# Patient Record
Sex: MALE | Race: WHITE | NOT HISPANIC OR LATINO | Employment: OTHER | URBAN - METROPOLITAN AREA
[De-identification: names, ages, dates, MRNs, and addresses within clinical notes are randomized per-mention and may not be internally consistent; named-entity substitution may affect disease eponyms.]

---

## 2018-06-25 ENCOUNTER — OFFICE VISIT (OUTPATIENT)
Dept: GASTROENTEROLOGY | Facility: CLINIC | Age: 68
End: 2018-06-25
Payer: COMMERCIAL

## 2018-06-25 VITALS
HEART RATE: 68 BPM | BODY MASS INDEX: 31.27 KG/M2 | DIASTOLIC BLOOD PRESSURE: 88 MMHG | WEIGHT: 199.2 LBS | TEMPERATURE: 98.5 F | SYSTOLIC BLOOD PRESSURE: 112 MMHG | HEIGHT: 67 IN

## 2018-06-25 DIAGNOSIS — R19.5 OCCULT BLOOD IN STOOLS: Primary | ICD-10-CM

## 2018-06-25 PROCEDURE — 99244 OFF/OP CNSLTJ NEW/EST MOD 40: CPT | Performed by: INTERNAL MEDICINE

## 2018-06-25 RX ORDER — HYDROCODONE BITARTRATE AND ACETAMINOPHEN 5; 325 MG/1; MG/1
TABLET ORAL
Refills: 0 | COMMUNITY
Start: 2018-05-26

## 2018-06-25 RX ORDER — FUROSEMIDE 20 MG/1
20 TABLET ORAL
COMMUNITY
Start: 2018-06-14

## 2018-06-25 RX ORDER — ATORVASTATIN CALCIUM 10 MG/1
10 TABLET, FILM COATED ORAL
COMMUNITY
Start: 2018-06-14

## 2018-06-25 RX ORDER — LEVOTHYROXINE SODIUM 0.05 MG/1
50 TABLET ORAL
COMMUNITY
Start: 2018-06-14

## 2018-06-25 RX ORDER — METOPROLOL SUCCINATE 100 MG/1
100 TABLET, EXTENDED RELEASE ORAL
COMMUNITY
Start: 2018-06-14

## 2018-06-25 RX ORDER — CILOSTAZOL 50 MG/1
50 TABLET ORAL
COMMUNITY
Start: 2018-06-14

## 2018-06-25 RX ORDER — OMEPRAZOLE 40 MG/1
CAPSULE, DELAYED RELEASE ORAL
Refills: 3 | COMMUNITY
Start: 2018-06-19

## 2018-06-25 RX ORDER — ACETAMINOPHEN 500 MG
500 TABLET ORAL
COMMUNITY

## 2018-06-25 RX ORDER — ALPRAZOLAM 0.25 MG/1
0.25 TABLET ORAL
COMMUNITY

## 2018-06-25 NOTE — ASSESSMENT & PLAN NOTE
Occult blood in the stool  Possible from colorectal lesions including polyps or malignancy  Should also rule out peptic ulcer disease or gastric erosions  Rule out AV malformations  Patient has history of what appeared to be a Billroth II for bleeding peptic ulcer disease    -Schedule for both EGD and colonoscopy  -High-fiber diet     -Patient was given instructions about the colonoscopy prep     -Patient was explained about  the risks and benefits of the procedure  Risks including but not limited to bleeding, infection, perforation were explained in detail  Also explained about less than 100% sensitivity with the exam and other alternatives

## 2018-06-25 NOTE — PROGRESS NOTES
Consultation - [facility]  Marsha Key 1950 male         Chief Complaint:  Heme-positive stool    HPI:  14-year-old male with history of heart disease was referred for colonoscopy  He was tested positive to for occult blood in the stool  He reports straining hard that day but the stool was normal brown colored  Patient has regular bowel movements and denies any blood or mucus in the stool  Appetite is good and denies any recent weight loss  Denies any abdominal pain, nausea, or vomiting  Has no heartburn or acid reflux  Denies any difficulty swallowing  Patient gives history of stomach surgery for bleeding peptic ulcer disease about 15 years ago       REVIEW OF SYSTEMS: Review of Systems   Constitutional: Negative for activity change, appetite change, chills, diaphoresis, fatigue, fever and unexpected weight change  HENT: Negative for ear discharge, ear pain, facial swelling, hearing loss, nosebleeds, sore throat, tinnitus and voice change  Eyes: Negative for pain, discharge, redness, itching and visual disturbance  Respiratory: Negative for apnea, cough, chest tightness, shortness of breath and wheezing  Cardiovascular: Negative for chest pain and palpitations  Gastrointestinal:        As noted in HPI   Endocrine: Negative for cold intolerance, heat intolerance and polyuria  Genitourinary: Negative for difficulty urinating, dysuria, flank pain, hematuria and urgency  Musculoskeletal: Negative for arthralgias, back pain, gait problem, joint swelling and myalgias  Skin: Negative for rash and wound  Neurological: Negative for dizziness, tremors, seizures, speech difficulty, light-headedness, numbness and headaches  Hematological: Negative for adenopathy  Does not bruise/bleed easily  Psychiatric/Behavioral: Negative for agitation, behavioral problems and confusion  The patient is not nervous/anxious           Past Medical History:   Diagnosis Date    Arthritis     COPD (chronic obstructive pulmonary disease) (HCC)     Hepatitis C       Past Surgical History:   Procedure Laterality Date    ABDOMINAL SURGERY      HOLE IN STOMACH    CARDIAC SURGERY  2016     Social History     Social History    Marital status: /Civil Union     Spouse name: N/A    Number of children: N/A    Years of education: N/A     Occupational History    Not on file  Social History Main Topics    Smoking status: Former Smoker    Smokeless tobacco: Never Used    Alcohol use No    Drug use: No    Sexual activity: Not on file     Other Topics Concern    Not on file     Social History Narrative    No narrative on file     Family History   Problem Relation Age of Onset    Emphysema Mother     Heart disease Father      Patient has no known allergies  Current Outpatient Prescriptions   Medication Sig Dispense Refill    acetaminophen (TYLENOL) 500 mg tablet Take 500 mg by mouth      ALPRAZolam (XANAX) 0 25 mg tablet Take 0 25 mg by mouth      atorvastatin (LIPITOR) 10 mg tablet Take 10 mg by mouth      Cholecalciferol 2000 units CAPS Take by mouth      cilostazol (PLETAL) 50 mg tablet Take 50 mg by mouth      furosemide (LASIX) 20 mg tablet Take 20 mg by mouth      HYDROcodone-acetaminophen (NORCO) 5-325 mg per tablet   0    levothyroxine 50 mcg tablet Take 50 mcg by mouth      metoprolol succinate (TOPROL-XL) 100 mg 24 hr tablet Take 100 mg by mouth      omeprazole (PriLOSEC) 40 MG capsule   3    sacubitril-valsartan (ENTRESTO) 24-26 MG TABS TAKE 1 TABLET TWICE A DAY      VENTOLIN  (90 Base) MCG/ACT inhaler   6     No current facility-administered medications for this visit  Blood pressure 112/88, pulse 68, temperature 98 5 °F (36 9 °C), temperature source Tympanic, height 5' 7" (1 702 m), weight 90 4 kg (199 lb 3 2 oz)  PHYSICAL EXAM: Physical Exam   Constitutional: He is oriented to person, place, and time  He appears well-developed     HENT: Head: Normocephalic and atraumatic  Mouth/Throat: Oropharynx is clear and moist    Eyes: Conjunctivae are normal  Pupils are equal, round, and reactive to light  Right eye exhibits no discharge  Left eye exhibits no discharge  No scleral icterus  Neck: Neck supple  No JVD present  No tracheal deviation present  No thyromegaly present  Cardiovascular: Normal rate, regular rhythm, normal heart sounds and intact distal pulses  Exam reveals no gallop and no friction rub  No murmur heard  Pulmonary/Chest: Effort normal and breath sounds normal  No respiratory distress  He has no wheezes  He has no rales  He exhibits no tenderness  Abdominal: Soft  Bowel sounds are normal  He exhibits no distension and no mass  There is no tenderness  There is no rebound and no guarding  No hernia  Musculoskeletal: He exhibits no edema  Lymphadenopathy:     He has no cervical adenopathy  Neurological: He is alert and oriented to person, place, and time  Skin: Skin is warm and dry  No rash noted  No erythema  Psychiatric: He has a normal mood and affect  His behavior is normal  Thought content normal         No results found for: WBC, HGB, HCT, MCV, PLT  No results found for: GLUCOSE, CALCIUM, NA, K, CO2, CL, BUN, CREATININE  No results found for: ALT, AST, GGT, ALKPHOS, BILITOT  No results found for: INR, PROTIME    Patient was never admitted  ASSESSMENT & PLAN:    Occult blood in stools  Occult blood in the stool  Possible from colorectal lesions including polyps or malignancy  Should also rule out peptic ulcer disease or gastric erosions  Rule out AV malformations  Patient has history of what appeared to be a Billroth II for bleeding peptic ulcer disease    -Schedule for both EGD and colonoscopy  -High-fiber diet     -Patient was given instructions about the colonoscopy prep     -Patient was explained about  the risks and benefits of the procedure   Risks including but not limited to bleeding, infection, perforation were explained in detail  Also explained about less than 100% sensitivity with the exam and other alternatives

## 2018-06-25 NOTE — LETTER
June 25, 2018     [address]    Patient: Linnette Hughes   YOB: 1950   Date of Visit: 6/25/2018       Dear Dr Karon Ritter: Thank you for referring Linnette Hughes to me for evaluation  Below are my notes for this consultation  If you have questions, please do not hesitate to call me  I look forward to following your patient along with you           Sincerely,        Manuel Cheng MD        CC: Mery Perez

## 2018-07-23 ENCOUNTER — ANESTHESIA EVENT (OUTPATIENT)
Dept: GASTROENTEROLOGY | Facility: AMBULARY SURGERY CENTER | Age: 68
End: 2018-07-23
Payer: COMMERCIAL

## 2018-07-23 NOTE — ANESTHESIA PREPROCEDURE EVALUATION
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  Now in bigeminy  Might need ablation if it persists  Patient ET is limited due to SOB  Can walk barely a block  Is a chronic smoker,  Pulmonary  Smoker ex-smoker  , COPD ,        GI/Hepatic    Liver disease , Hepatitis C,             Endo/Other  History of thyroid disease , hypothyroidism,      GYN       Hematology   Musculoskeletal    Arthritis     Neurology   Psychology           Physical Exam    Airway    Mallampati score: II  TM Distance: >3 FB  Neck ROM: full     Dental   upper dentures and lower dentures,     Cardiovascular  Cardiovascular exam normal    Pulmonary  Pulmonary exam normal     Other Findings        Anesthesia Plan  ASA Score- 4     Anesthesia Type- IV sedation with anesthesia with ASA Monitors  Additional Monitors:   Airway Plan:         Plan Factors-    Induction-     Postoperative Plan-     Informed Consent- Anesthetic plan and risks discussed with patient  I personally reviewed this patient with the CRNA  Discussed and agreed on the Anesthesia Plan with the CRNA  Richi Zepeda

## 2018-07-24 ENCOUNTER — ANESTHESIA (OUTPATIENT)
Dept: GASTROENTEROLOGY | Facility: AMBULARY SURGERY CENTER | Age: 68
End: 2018-07-24
Payer: COMMERCIAL

## 2018-07-24 ENCOUNTER — HOSPITAL ENCOUNTER (OUTPATIENT)
Facility: AMBULARY SURGERY CENTER | Age: 68
Setting detail: OUTPATIENT SURGERY
Discharge: HOME | End: 2018-07-24
Attending: INTERNAL MEDICINE | Admitting: INTERNAL MEDICINE
Payer: COMMERCIAL

## 2018-07-24 VITALS
SYSTOLIC BLOOD PRESSURE: 140 MMHG | WEIGHT: 199 LBS | OXYGEN SATURATION: 98 % | HEIGHT: 67 IN | DIASTOLIC BLOOD PRESSURE: 73 MMHG | BODY MASS INDEX: 31.23 KG/M2 | HEART RATE: 68 BPM | RESPIRATION RATE: 18 BRPM | TEMPERATURE: 97.4 F

## 2018-07-24 DIAGNOSIS — R19.5 OCCULT BLOOD IN STOOLS: Primary | ICD-10-CM

## 2018-07-24 PROCEDURE — 43235 EGD DIAGNOSTIC BRUSH WASH: CPT | Performed by: INTERNAL MEDICINE

## 2018-07-24 PROCEDURE — G0105 COLORECTAL SCRN; HI RISK IND: HCPCS | Performed by: INTERNAL MEDICINE

## 2018-07-24 RX ORDER — SODIUM CHLORIDE 9 MG/ML
125 INJECTION, SOLUTION INTRAVENOUS CONTINUOUS
Status: DISCONTINUED | OUTPATIENT
Start: 2018-07-24 | End: 2018-07-24 | Stop reason: HOSPADM

## 2018-07-24 RX ORDER — LIDOCAINE HYDROCHLORIDE 10 MG/ML
INJECTION, SOLUTION INFILTRATION; PERINEURAL AS NEEDED
Status: DISCONTINUED | OUTPATIENT
Start: 2018-07-24 | End: 2018-07-24 | Stop reason: SURG

## 2018-07-24 RX ORDER — PROPOFOL 10 MG/ML
INJECTION, EMULSION INTRAVENOUS CONTINUOUS PRN
Status: DISCONTINUED | OUTPATIENT
Start: 2018-07-24 | End: 2018-07-24 | Stop reason: SURG

## 2018-07-24 RX ORDER — PROPOFOL 10 MG/ML
INJECTION, EMULSION INTRAVENOUS AS NEEDED
Status: DISCONTINUED | OUTPATIENT
Start: 2018-07-24 | End: 2018-07-24 | Stop reason: SURG

## 2018-07-24 RX ORDER — FENTANYL CITRATE 50 UG/ML
INJECTION, SOLUTION INTRAMUSCULAR; INTRAVENOUS AS NEEDED
Status: DISCONTINUED | OUTPATIENT
Start: 2018-07-24 | End: 2018-07-24 | Stop reason: SURG

## 2018-07-24 RX ADMIN — SODIUM CHLORIDE 125 ML/HR: 0.9 INJECTION, SOLUTION INTRAVENOUS at 09:00

## 2018-07-24 RX ADMIN — PROPOFOL 100 MG: 10 INJECTION, EMULSION INTRAVENOUS at 09:49

## 2018-07-24 RX ADMIN — LIDOCAINE HYDROCHLORIDE 30 MG: 10 INJECTION, SOLUTION INFILTRATION; PERINEURAL at 09:49

## 2018-07-24 RX ADMIN — PROPOFOL 60 MCG/KG/MIN: 10 INJECTION, EMULSION INTRAVENOUS at 09:55

## 2018-07-24 RX ADMIN — FENTANYL CITRATE 50 MCG: 50 INJECTION, SOLUTION INTRAMUSCULAR; INTRAVENOUS at 09:49

## 2018-07-24 NOTE — OP NOTE
ESOPHAGOGASTRODUODENOSCOPY    PROCEDURE: EGD    INDICATIONS: Occult GI Bleeding    POST-OP DIAGNOSIS: See the impression below    SEDATION: Monitored anesthesia care, check anesthesia records    PHYSICAL EXAM:    Vitals:    07/24/18 1028   BP: 140/73   Pulse: 68   Resp: 18   Temp:    SpO2: 98%    Body mass index is 31 17 kg/m²  General: NAD  Heart: S1 & S2 normal, RRR  Lungs: CTA, No rales or rhonchi  Abdomen: Soft, nontender, nondistended, good bowel sounds    CONSENT:  Informed consent was obtained for the procedure, including sedation after explaining the risks and benefits of the procedure  Risks including but not limited to bleeding, perforation, infection, aspiration were discussed in detail  Also explained about less than 100% sensitivity with the exam and other alternatives  PREPARATION:   EKG tracing, pulse oximetry, blood pressure were monitored throughout the procedure  Patient was identified by myself both verbally and by visual inspection of ID band  DESCRIPTION:   Patient was placed in the left lateral decubitus position and was sedated with the above medication  The gastroscope was introduced in to the oropharynx and the esophagus was intubated under direct visualization  Scope was passed down the esophagus up to 2nd part of the duodenum  A careful inspection was made as the gastroscope was withdrawn, including a retroflexed view of the stomach; findings and interventions are described below  FINDINGS:    #1  Esophagus and GEJ- Normal    #2  Stomach- normal mucosa  There appeared to be a small scar in the pre-pyloric area    #3  Duodenum- normal         IMPRESSIONS:      As above    RECOMMENDATIONS:     Proceed with colonoscopy    COMPLICATIONS:  None; patient tolerated the procedure well            DISPOSITION: PACU           CONDITION: Stable

## 2018-07-24 NOTE — H&P (VIEW-ONLY)
Consultation - [facility]  Partha Pandey 1950 male         Chief Complaint:  Heme-positive stool    HPI:  25-year-old male with history of heart disease was referred for colonoscopy  He was tested positive to for occult blood in the stool  He reports straining hard that day but the stool was normal brown colored  Patient has regular bowel movements and denies any blood or mucus in the stool  Appetite is good and denies any recent weight loss  Denies any abdominal pain, nausea, or vomiting  Has no heartburn or acid reflux  Denies any difficulty swallowing  Patient gives history of stomach surgery for bleeding peptic ulcer disease about 15 years ago       REVIEW OF SYSTEMS: Review of Systems   Constitutional: Negative for activity change, appetite change, chills, diaphoresis, fatigue, fever and unexpected weight change  HENT: Negative for ear discharge, ear pain, facial swelling, hearing loss, nosebleeds, sore throat, tinnitus and voice change  Eyes: Negative for pain, discharge, redness, itching and visual disturbance  Respiratory: Negative for apnea, cough, chest tightness, shortness of breath and wheezing  Cardiovascular: Negative for chest pain and palpitations  Gastrointestinal:        As noted in HPI   Endocrine: Negative for cold intolerance, heat intolerance and polyuria  Genitourinary: Negative for difficulty urinating, dysuria, flank pain, hematuria and urgency  Musculoskeletal: Negative for arthralgias, back pain, gait problem, joint swelling and myalgias  Skin: Negative for rash and wound  Neurological: Negative for dizziness, tremors, seizures, speech difficulty, light-headedness, numbness and headaches  Hematological: Negative for adenopathy  Does not bruise/bleed easily  Psychiatric/Behavioral: Negative for agitation, behavioral problems and confusion  The patient is not nervous/anxious           Past Medical History:   Diagnosis Date    Arthritis     COPD (chronic obstructive pulmonary disease) (HCC)     Hepatitis C       Past Surgical History:   Procedure Laterality Date    ABDOMINAL SURGERY      HOLE IN STOMACH    CARDIAC SURGERY  2016     Social History     Social History    Marital status: /Civil Union     Spouse name: N/A    Number of children: N/A    Years of education: N/A     Occupational History    Not on file  Social History Main Topics    Smoking status: Former Smoker    Smokeless tobacco: Never Used    Alcohol use No    Drug use: No    Sexual activity: Not on file     Other Topics Concern    Not on file     Social History Narrative    No narrative on file     Family History   Problem Relation Age of Onset    Emphysema Mother     Heart disease Father      Patient has no known allergies  Current Outpatient Prescriptions   Medication Sig Dispense Refill    acetaminophen (TYLENOL) 500 mg tablet Take 500 mg by mouth      ALPRAZolam (XANAX) 0 25 mg tablet Take 0 25 mg by mouth      atorvastatin (LIPITOR) 10 mg tablet Take 10 mg by mouth      Cholecalciferol 2000 units CAPS Take by mouth      cilostazol (PLETAL) 50 mg tablet Take 50 mg by mouth      furosemide (LASIX) 20 mg tablet Take 20 mg by mouth      HYDROcodone-acetaminophen (NORCO) 5-325 mg per tablet   0    levothyroxine 50 mcg tablet Take 50 mcg by mouth      metoprolol succinate (TOPROL-XL) 100 mg 24 hr tablet Take 100 mg by mouth      omeprazole (PriLOSEC) 40 MG capsule   3    sacubitril-valsartan (ENTRESTO) 24-26 MG TABS TAKE 1 TABLET TWICE A DAY      VENTOLIN  (90 Base) MCG/ACT inhaler   6     No current facility-administered medications for this visit  Blood pressure 112/88, pulse 68, temperature 98 5 °F (36 9 °C), temperature source Tympanic, height 5' 7" (1 702 m), weight 90 4 kg (199 lb 3 2 oz)  PHYSICAL EXAM: Physical Exam   Constitutional: He is oriented to person, place, and time  He appears well-developed     HENT: Head: Normocephalic and atraumatic  Mouth/Throat: Oropharynx is clear and moist    Eyes: Conjunctivae are normal  Pupils are equal, round, and reactive to light  Right eye exhibits no discharge  Left eye exhibits no discharge  No scleral icterus  Neck: Neck supple  No JVD present  No tracheal deviation present  No thyromegaly present  Cardiovascular: Normal rate, regular rhythm, normal heart sounds and intact distal pulses  Exam reveals no gallop and no friction rub  No murmur heard  Pulmonary/Chest: Effort normal and breath sounds normal  No respiratory distress  He has no wheezes  He has no rales  He exhibits no tenderness  Abdominal: Soft  Bowel sounds are normal  He exhibits no distension and no mass  There is no tenderness  There is no rebound and no guarding  No hernia  Musculoskeletal: He exhibits no edema  Lymphadenopathy:     He has no cervical adenopathy  Neurological: He is alert and oriented to person, place, and time  Skin: Skin is warm and dry  No rash noted  No erythema  Psychiatric: He has a normal mood and affect  His behavior is normal  Thought content normal         No results found for: WBC, HGB, HCT, MCV, PLT  No results found for: GLUCOSE, CALCIUM, NA, K, CO2, CL, BUN, CREATININE  No results found for: ALT, AST, GGT, ALKPHOS, BILITOT  No results found for: INR, PROTIME    Patient was never admitted  ASSESSMENT & PLAN:    Occult blood in stools  Occult blood in the stool  Possible from colorectal lesions including polyps or malignancy  Should also rule out peptic ulcer disease or gastric erosions  Rule out AV malformations  Patient has history of what appeared to be a Billroth II for bleeding peptic ulcer disease    -Schedule for both EGD and colonoscopy  -High-fiber diet     -Patient was given instructions about the colonoscopy prep     -Patient was explained about  the risks and benefits of the procedure   Risks including but not limited to bleeding, infection, perforation were explained in detail  Also explained about less than 100% sensitivity with the exam and other alternatives

## 2018-07-24 NOTE — OP NOTE
COLONOSCOPY    PROCEDURE: Colonoscopy    INDICATIONS:  Heme-positive stool    POST-OP DIAGNOSIS: See the impression below    SEDATION: Monitored anesthesia care, check anesthesia records    PHYSICAL EXAM:    /73 (BP Location: Left arm)   Pulse 68   Temp (!) 97 4 °F (36 3 °C) (Tympanic)   Resp 18   Ht 5' 7" (1 702 m)   Wt 90 3 kg (199 lb)   SpO2 98%   BMI 31 17 kg/m²   Body mass index is 31 17 kg/m²  General: NAD  Heart: S1 & S2 normal, RRR  Lungs: CTA, No rales or rhonchi  Abdomen: Soft, nontender, nondistended, good bowel sounds    CONSENT:  Informed consent was obtained for the procedure, including sedation after explaining the risks and benefits of the procedure  Risks including but not limited to bleeding, perforation, infection, aspiration were discussed in detail  Also explained about less than 100%$ sensitivity with the exam and other alternatives  PREPARATION:   EKG tracing, pulse oximetry, blood pressure were monitored throughout the procedure  Patient was identified by myself both verbally and by visual inspection of ID band  DESCRIPTION:   Patient was placed in the left lateral decubitus position and was sedated with the above medication  Digital rectal examination was performed  The colonoscope was introduced in to the anal canal and advanced up to cecum, which was identified by the appendiceal orifice and IC valve  A careful inspection was made as the colonoscope was withdrawn, including a retroflexed view of the rectum; findings and interventions are described below  Appropriate photodocumentation was obtained  The quality of the colonic preparation was adequate  FINDINGS:    1  Cecum and ileocecal valve-normal mucosa    2  Multiple diverticuli noted throughout the colon    3  Small internal hemorrhoids         IMPRESSIONS:      As above    RECOMMENDATIONS:    Check CBC and also order small bowel series to complete the workup    Heme-positive stool could be from hemorrhoids  COMPLICATIONS:  None; patient tolerated the procedure well      DISPOSITION: PACU           CONDITION: Stable

## 2024-10-25 ENCOUNTER — OFFICE VISIT (OUTPATIENT)
Dept: URGENT CARE | Facility: CLINIC | Age: 74
End: 2024-10-25
Payer: MEDICARE

## 2024-10-25 VITALS
WEIGHT: 162.2 LBS | HEIGHT: 67 IN | OXYGEN SATURATION: 96 % | HEART RATE: 75 BPM | BODY MASS INDEX: 25.46 KG/M2 | SYSTOLIC BLOOD PRESSURE: 150 MMHG | DIASTOLIC BLOOD PRESSURE: 80 MMHG | TEMPERATURE: 96.2 F | RESPIRATION RATE: 18 BRPM

## 2024-10-25 DIAGNOSIS — S51.811A FOREARM LACERATION, RIGHT, INITIAL ENCOUNTER: Primary | ICD-10-CM

## 2024-10-25 DIAGNOSIS — Z23 ENCOUNTER FOR IMMUNIZATION: ICD-10-CM

## 2024-10-25 DIAGNOSIS — S50.811A ABRASION OF RIGHT FOREARM, INITIAL ENCOUNTER: ICD-10-CM

## 2024-10-25 PROCEDURE — 90471 IMMUNIZATION ADMIN: CPT | Performed by: FAMILY MEDICINE

## 2024-10-25 PROCEDURE — 99213 OFFICE O/P EST LOW 20 MIN: CPT | Performed by: FAMILY MEDICINE

## 2024-10-25 PROCEDURE — 90715 TDAP VACCINE 7 YRS/> IM: CPT | Performed by: FAMILY MEDICINE

## 2024-10-25 PROCEDURE — 12032 INTMD RPR S/A/T/EXT 2.6-7.5: CPT | Performed by: FAMILY MEDICINE

## 2024-10-25 RX ORDER — BUDESONIDE AND FORMOTEROL FUMARATE DIHYDRATE 160; 4.5 UG/1; UG/1
2 AEROSOL RESPIRATORY (INHALATION) 2 TIMES DAILY
COMMUNITY

## 2024-10-25 RX ORDER — AMIODARONE HYDROCHLORIDE 200 MG/1
100 TABLET ORAL DAILY
COMMUNITY

## 2024-10-25 RX ORDER — IPRATROPIUM BROMIDE AND ALBUTEROL SULFATE .5; 3 MG/3ML; MG/3ML
3 SOLUTION RESPIRATORY (INHALATION) EVERY 6 HOURS PRN
COMMUNITY

## 2024-10-25 RX ORDER — SACUBITRIL AND VALSARTAN 49; 51 MG/1; MG/1
1 TABLET, FILM COATED ORAL 2 TIMES DAILY
COMMUNITY
Start: 2024-08-13

## 2024-10-25 RX ORDER — TIOTROPIUM BROMIDE 18 UG/1
18 CAPSULE ORAL; RESPIRATORY (INHALATION) DAILY
COMMUNITY

## 2024-10-25 RX ORDER — ASPIRIN 81 MG/1
81 TABLET ORAL DAILY
COMMUNITY

## 2024-10-25 RX ORDER — EMPAGLIFLOZIN 10 MG/1
10 TABLET, FILM COATED ORAL DAILY
COMMUNITY
Start: 2024-10-15

## 2024-10-25 NOTE — PROGRESS NOTES
"  [facility]        NAME: Petr Silva is a 73 y.o. male  : 1950    MRN: [id number]  DATE: 2024  TIME: 1:28 PM    Assessment and Plan   Forearm laceration, right, initial encounter [S51.811A]  1. Forearm laceration, right, initial encounter  Laceration repair      2. Abrasion of right forearm, initial encounter  Tdap Vaccine greater than or equal to 8yo      3. Encounter for immunization  Tdap Vaccine greater than or equal to 8yo        Dispensed medication: Bactrim DS x 3 days.    Universal Protocol:  Consent: The procedure was performed in an emergent situation. Verbal consent obtained.  Risks and benefits: risks, benefits and alternatives were discussed  Consent given by: patient and spouse  Time out: Immediately prior to procedure a \"time out\" was called to verify the correct patient, procedure, equipment, support staff and site/side marked as required.  Patient understanding: patient states understanding of the procedure being performed  Required items: required blood products, implants, devices, and special equipment available  Patient identity confirmed: verbally with patient  Laceration repair    Date/Time: 10/25/2024 3:30 PM    Performed by: Tommy Luna MD  Authorized by: Tommy Luna MD  Body area: upper extremity  Location details: right lower arm  Laceration length: 4 cm  Contamination: The wound is contaminated.  Foreign bodies: no foreign bodies  Tendon involvement: none  Nerve involvement: none  Anesthesia: local infiltration    Anesthesia:  Local Anesthetic: lidocaine 2% without epinephrine  Anesthetic total: 4 mL    Sedation:  Patient sedated: no        Procedure Details:  Preparation: Patient was prepped and draped in the usual sterile fashion.  Irrigation solution: saline (With Betadine)  Irrigation method: syringe  Amount of cleaning: standard  Debridement: minimal  Degree of undermining: none  Skin closure: 3-0 nylon  Number of sutures: " 5  Technique: simple  Approximation: close  Approximation difficulty: simple  Dressing: Xeroform gauze over the laceration and adjacent abrasions/skin tears.  Covered by nonadherent dressing and Coban.  Patient tolerance: patient tolerated the procedure well with no immediate complications  Comments: Tdap given.  3 days of Bactrim for wound prophylaxis.  Will return early next week for wound reassessment.  Sutures likely to be removed in around 2 weeks.  Wound care instructions given.        Patient Instructions     Follow up with PCP in 3-5 days.  Proceed to  ER if symptoms worsen.    If tests have been performed at [facility], our office will contact you with results if changes need to be made to the care plan discussed with you at the visit.  You can review your full results on [facility].    Chief Complaint     Chief Complaint   Patient presents with    Laceration     Laceration R forearm today - hit ground after falling off tractor (3 feet high). Not sure what cut him. States last Td <5 years ago         History of Present Illness       73-year-old right-hand-dominant male presents today due to a laceration sustained today on his right forearm after falling 3 feet from the seat of his tractor.  Fell onto a piece of old fence.  Last Tdap was    Laceration       Review of Systems   Review of Systems   Constitutional:  Negative for chills and fever.   Respiratory:  Negative for cough and shortness of breath.    Cardiovascular:  Negative for chest pain.   Gastrointestinal:  Negative for abdominal pain and nausea.   Musculoskeletal:  Positive for back pain.   Skin:  Positive for color change and wound.   Neurological:  Negative for dizziness and headaches.     Current Medications       Current Outpatient Medications:     acetaminophen (TYLENOL) 500 mg tablet, Take 500 mg by mouth, Disp: , Rfl:     ALPRAZolam (XANAX) 0.25 mg tablet, Take 0.25 mg by mouth, Disp: , Rfl:     amiodarone 200 mg tablet, Take 100 mg  by mouth daily, Disp: , Rfl:     aspirin (ECOTRIN LOW STRENGTH) 81 mg EC tablet, Take 81 mg by mouth daily, Disp: , Rfl:     atorvastatin (LIPITOR) 10 mg tablet, Take 10 mg by mouth, Disp: , Rfl:     Cholecalciferol 2000 units CAPS, Take by mouth, Disp: , Rfl:     cilostazol (PLETAL) 50 mg tablet, Take 50 mg by mouth, Disp: , Rfl:     Entresto 49-51 MG TABS, Take 1 tablet by mouth 2 (two) times a day, Disp: , Rfl:     furosemide (LASIX) 20 mg tablet, Take 20 mg by mouth, Disp: , Rfl:     HYDROcodone-acetaminophen (NORCO) 5-325 mg per tablet, , Disp: , Rfl: 0    ipratropium-albuterol, FOR EMS ONLY, (DUO-NEB) 0.5-2.5 mg/3 mL nebulizer solution, 3 mL every 6 (six) hours as needed, Disp: , Rfl:     Jardiance 10 MG TABS tablet, Take 10 mg by mouth daily, Disp: , Rfl:     levothyroxine 50 mcg tablet, Take 50 mcg by mouth, Disp: , Rfl:     metoprolol succinate (TOPROL-XL) 100 mg 24 hr tablet, Take 100 mg by mouth, Disp: , Rfl:     omeprazole (PriLOSEC) 40 MG capsule, , Disp: , Rfl: 3    Symbicort 160-4.5 MCG/ACT inhaler, Inhale 2 puffs 2 (two) times a day, Disp: , Rfl:     tiotropium (SPIRIVA) 18 mcg inhalation capsule, Place 18 mcg into inhaler and inhale daily, Disp: , Rfl:     VENTOLIN  (90 Base) MCG/ACT inhaler, , Disp: , Rfl: 6    Current Allergies     Allergies as of 10/25/2024    (No Known Allergies)            The following portions of the patient's history were reviewed and updated as appropriate: allergies, current medications, past family history, past medical history, past social history, past surgical history and problem list.     Past Medical History:   Diagnosis Date    Arthritis     CHF (congestive heart failure) (HCC)     COPD (chronic obstructive pulmonary disease) (HCC)     Coronary artery disease     Heart failure (HCC)     Hepatitis C     Hx of CABG     Hypertension     Irregular heart beat     Paroxysmal A-fib (HCC)        Past Surgical History:   Procedure Laterality Date    ABDOMINAL SURGERY   "    HOLE IN STOMACH    CARDIAC PACEMAKER PLACEMENT  01/2017    icd/pacer    CARDIAC SURGERY  2016    CORONARY ARTERY BYPASS GRAFT      ME COLONOSCOPY FLX DX W/COLLJ SPEC WHEN PFRMD N/A 7/24/2018    Procedure: EGD AND COLONOSCOPY;  Surgeon: Rosalino Branch MD;  Location: [facility];  Service: Gastroenterology       Family History   Problem Relation Age of Onset    Emphysema Mother     Heart disease Father          Medications have been verified.        Objective   /80   Pulse 75   Temp (!) 96.2 °F (35.7 °C)   Resp 18   Ht 5' 7\" (1.702 m)   Wt 73.6 kg (162 lb 3.2 oz)   SpO2 96%   BMI 25.40 kg/m²   No LMP for male patient.       Physical Exam     Physical Exam  Vitals and nursing note reviewed.   Constitutional:       General: He is in acute distress.      Appearance: Normal appearance. He is normal weight. He is not ill-appearing, toxic-appearing or diaphoretic.   HENT:      Head: Normocephalic and atraumatic.   Eyes:      General:         Right eye: No discharge.         Left eye: No discharge.      Conjunctiva/sclera: Conjunctivae normal.   Pulmonary:      Effort: Pulmonary effort is normal.   Skin:     General: Skin is warm.      Findings: Lesion (4cm linear laceration on the R forearm with adjacent skin tears and abrasions.) present. No erythema.   Neurological:      General: No focal deficit present.      Mental Status: He is alert and oriented to person, place, and time.   Psychiatric:         Mood and Affect: Mood normal.         Behavior: Behavior normal.         Thought Content: Thought content normal.         Judgment: Judgment normal.                   "

## 2024-10-28 ENCOUNTER — OFFICE VISIT (OUTPATIENT)
Dept: URGENT CARE | Facility: CLINIC | Age: 74
End: 2024-10-28
Payer: MEDICARE

## 2024-10-28 VITALS
DIASTOLIC BLOOD PRESSURE: 80 MMHG | WEIGHT: 158.13 LBS | HEART RATE: 73 BPM | OXYGEN SATURATION: 95 % | HEIGHT: 67 IN | TEMPERATURE: 98 F | SYSTOLIC BLOOD PRESSURE: 145 MMHG | BODY MASS INDEX: 24.82 KG/M2 | RESPIRATION RATE: 19 BRPM

## 2024-10-28 DIAGNOSIS — Z51.89 VISIT FOR WOUND CHECK: ICD-10-CM

## 2024-10-28 DIAGNOSIS — S51.811D FOREARM LACERATION, RIGHT, SUBSEQUENT ENCOUNTER: Primary | ICD-10-CM

## 2024-10-28 PROCEDURE — 99213 OFFICE O/P EST LOW 20 MIN: CPT | Performed by: PHYSICIAN ASSISTANT

## 2024-11-01 ENCOUNTER — APPOINTMENT (OUTPATIENT)
Dept: RADIOLOGY | Facility: CLINIC | Age: 74
End: 2024-11-01
Payer: MEDICARE

## 2024-11-01 ENCOUNTER — OFFICE VISIT (OUTPATIENT)
Dept: URGENT CARE | Facility: CLINIC | Age: 74
End: 2024-11-01
Payer: MEDICARE

## 2024-11-01 VITALS
BODY MASS INDEX: 24.83 KG/M2 | DIASTOLIC BLOOD PRESSURE: 70 MMHG | WEIGHT: 158.2 LBS | HEIGHT: 67 IN | OXYGEN SATURATION: 98 % | SYSTOLIC BLOOD PRESSURE: 130 MMHG | TEMPERATURE: 95.6 F | HEART RATE: 60 BPM | RESPIRATION RATE: 18 BRPM

## 2024-11-01 DIAGNOSIS — M54.9 DISCOMFORT OF BACK: Primary | ICD-10-CM

## 2024-11-01 DIAGNOSIS — E03.9 HYPOTHYROIDISM, UNSPECIFIED TYPE: ICD-10-CM

## 2024-11-01 DIAGNOSIS — M54.9 DISCOMFORT OF BACK: ICD-10-CM

## 2024-11-01 DIAGNOSIS — S41.111D ARM LACERATION, RIGHT, SUBSEQUENT ENCOUNTER: ICD-10-CM

## 2024-11-01 DIAGNOSIS — M70.22 OLECRANON BURSITIS OF LEFT ELBOW: ICD-10-CM

## 2024-11-01 PROCEDURE — 99213 OFFICE O/P EST LOW 20 MIN: CPT | Performed by: FAMILY MEDICINE

## 2024-11-01 PROCEDURE — 73010 X-RAY EXAM OF SHOULDER BLADE: CPT

## 2024-11-01 PROCEDURE — 87205 SMEAR GRAM STAIN: CPT | Performed by: FAMILY MEDICINE

## 2024-11-01 PROCEDURE — 20605 DRAIN/INJ JOINT/BURSA W/O US: CPT | Performed by: FAMILY MEDICINE

## 2024-11-01 PROCEDURE — 87077 CULTURE AEROBIC IDENTIFY: CPT | Performed by: FAMILY MEDICINE

## 2024-11-01 PROCEDURE — 87070 CULTURE OTHR SPECIMN AEROBIC: CPT | Performed by: FAMILY MEDICINE

## 2024-11-01 PROCEDURE — 71101 X-RAY EXAM UNILAT RIBS/CHEST: CPT

## 2024-11-01 RX ORDER — LEVOTHYROXINE SODIUM 50 UG/1
50 TABLET ORAL DAILY
Qty: 10 TABLET | Refills: 0 | Status: SHIPPED | OUTPATIENT
Start: 2024-11-01 | End: 2024-11-11

## 2024-11-01 NOTE — PROGRESS NOTES
"[facility]        NAME: Petr Silva is a 74 y.o. male  : 1950    MRN: [id number]  DATE: 2024  TIME: 1:20 PM    Assessment and Plan   Discomfort of back [M54.9]  1. Discomfort of back  XR ribs left w pa chest min 3 views    XR scapula left      2. Olecranon bursitis of left elbow  Medium joint arthrocentesis: L olecranon bursa    Body fluid culture and Gram stain      3. Arm laceration, right, subsequent encounter        4. Hypothyroidism, unspecified type  levothyroxine 50 mcg tablet        Left upper back discomfort likely due to bone contusion versus intercostal muscle strain.  Rib and scapula x-rays reveal shrapnel in the right upper back, but no obvious fractures though rib contrast is light possibly indicating osteopenia/osteoporosis.  Official read pending.  Lidocaine patches recommended.    Chronic left olecranon bursitis with extension distally of undetermined significance.  15 cc of serosanguineous fluid with solids aspirated from the elbow and sent for analysis.    Right forearm laceration appears to be healing well.  Will return in 1 week for suture removal.    Thyroid medication renewed for few days per patient request.    Medium joint arthrocentesis: L olecranon bursa  Universal Protocol:  procedure performed by consultantConsent: Verbal consent obtained.  Risks and benefits: risks, benefits and alternatives were discussed  Consent given by: patient  Time out: Immediately prior to procedure a \"time out\" was called to verify the correct patient, procedure, equipment, support staff and site/side marked as required.  Patient understanding: patient states understanding of the procedure being performed  Required items: required blood products, implants, devices, and special equipment available  Patient identity confirmed: verbally with patient  Supporting Documentation  Indications: pain, joint swelling and diagnostic evaluation   Procedure Details  Location: elbow - L " olecranon bursa  Needle size: 18 G  Ultrasound guidance: no  Approach: posterior    Aspirate amount: 15 mL  Aspirate: blood-tinged  Analysis: fluid sample sent for laboratory analysis  Patient tolerance: patient tolerated the procedure well with no immediate complications  Sterile dressing applied: Band-Aid and Ace wrap.        Patient Instructions     Follow up with PCP in 3-5 days.  Proceed to  ER if symptoms worsen.    If tests have been performed at [facility], our office will contact you with results if changes need to be made to the care plan discussed with you at the visit.  You can review your full results on [facility].    Chief Complaint     Chief Complaint   Patient presents with    Back Pain    Mass     States has had soft mass L elbow area x several months - worse a 1 week (since fall). Alos having L upper back pain that radiates to L scapular area and L upper arm. Occ. Mid low back pain when sits. Taking Tylenol and using heating pad.          History of Present Illness       74-year-old male presents today for wound check of the right forearm laceration sustained from a fall off his truck into a dumpster.  Also reports progressively worsening back pain since the fall.  Pain persist despite use of Vicodin which she normally uses for his arthritis.  In addition, he reports a mass on the left elbow which has persisted for some months but has recently increased in size and tenderness over the past few weeks.    Back Pain  Pertinent negatives include no abdominal pain, chest pain or fever.       Review of Systems   Review of Systems   Constitutional:  Negative for chills and fever.   Respiratory:  Positive for cough. Negative for shortness of breath.    Cardiovascular:  Negative for chest pain.   Gastrointestinal:  Negative for abdominal pain and nausea.   Musculoskeletal:  Positive for back pain.   Skin:  Positive for rash.     Current Medications       Current Outpatient Medications:      acetaminophen (TYLENOL) 500 mg tablet, Take 500 mg by mouth, Disp: , Rfl:     ALPRAZolam (XANAX) 0.25 mg tablet, Take 0.25 mg by mouth, Disp: , Rfl:     amiodarone 200 mg tablet, Take 100 mg by mouth daily, Disp: , Rfl:     aspirin (ECOTRIN LOW STRENGTH) 81 mg EC tablet, Take 81 mg by mouth daily, Disp: , Rfl:     atorvastatin (LIPITOR) 10 mg tablet, Take 10 mg by mouth, Disp: , Rfl:     Cholecalciferol 2000 units CAPS, Take by mouth, Disp: , Rfl:     cilostazol (PLETAL) 50 mg tablet, Take 50 mg by mouth, Disp: , Rfl:     Entresto 49-51 MG TABS, Take 1 tablet by mouth 2 (two) times a day, Disp: , Rfl:     furosemide (LASIX) 20 mg tablet, Take 20 mg by mouth, Disp: , Rfl:     HYDROcodone-acetaminophen (NORCO) 5-325 mg per tablet, , Disp: , Rfl: 0    ipratropium-albuterol, FOR EMS ONLY, (DUO-NEB) 0.5-2.5 mg/3 mL nebulizer solution, 3 mL every 6 (six) hours as needed, Disp: , Rfl:     Jardiance 10 MG TABS tablet, Take 10 mg by mouth daily, Disp: , Rfl:     levothyroxine 50 mcg tablet, Take 1 tablet (50 mcg total) by mouth daily for 10 days, Disp: 10 tablet, Rfl: 0    metoprolol succinate (TOPROL-XL) 100 mg 24 hr tablet, Take 100 mg by mouth, Disp: , Rfl:     omeprazole (PriLOSEC) 40 MG capsule, , Disp: , Rfl: 3    Symbicort 160-4.5 MCG/ACT inhaler, Inhale 2 puffs 2 (two) times a day, Disp: , Rfl:     tiotropium (SPIRIVA) 18 mcg inhalation capsule, Place 18 mcg into inhaler and inhale daily, Disp: , Rfl:     VENTOLIN  (90 Base) MCG/ACT inhaler, , Disp: , Rfl: 6    Current Allergies     Allergies as of 11/01/2024 - Reviewed 11/01/2024   Allergen Reaction Noted    Bactrim [sulfamethoxazole-trimethoprim] GI Intolerance 11/01/2024            The following portions of the patient's history were reviewed and updated as appropriate: allergies, current medications, past family history, past medical history, past social history, past surgical history and problem list.     Past Medical History:   Diagnosis Date     "Arthritis     CHF (congestive heart failure) (HCC)     COPD (chronic obstructive pulmonary disease) (HCC)     Coronary artery disease     Heart failure (HCC)     Hepatitis C     Hx of CABG     Hypertension     Irregular heart beat     Paroxysmal A-fib (HCC)        Past Surgical History:   Procedure Laterality Date    ABDOMINAL SURGERY      HOLE IN STOMACH    CARDIAC PACEMAKER PLACEMENT  01/2017    icd/pacer    CARDIAC SURGERY  2016    CORONARY ARTERY BYPASS GRAFT      MA COLONOSCOPY FLX DX W/COLLJ SPEC WHEN PFRMD N/A 7/24/2018    Procedure: EGD AND COLONOSCOPY;  Surgeon: Rosalino Branch MD;  Location: [facility];  Service: Gastroenterology       Family History   Problem Relation Age of Onset    Emphysema Mother     Heart disease Father          Medications have been verified.        Objective   /70   Pulse 60 Comment: irregular  Temp (!) 95.6 °F (35.3 °C)   Resp 18   Ht 5' 7\" (1.702 m)   Wt 71.8 kg (158 lb 3.2 oz)   SpO2 98%   BMI 24.78 kg/m²   No LMP for male patient.       Physical Exam     Physical Exam  Vitals and nursing note reviewed.   Constitutional:       General: He is in acute distress.      Appearance: Normal appearance. He is normal weight. He is not ill-appearing, toxic-appearing or diaphoretic.   HENT:      Head: Normocephalic and atraumatic.      Mouth/Throat:      Mouth: Mucous membranes are moist.      Pharynx: No posterior oropharyngeal erythema.   Eyes:      General:         Right eye: No discharge.         Left eye: No discharge.      Conjunctiva/sclera: Conjunctivae normal.   Cardiovascular:      Rate and Rhythm: Normal rate and regular rhythm.   Pulmonary:      Effort: No respiratory distress.      Breath sounds: Normal breath sounds. No wheezing, rhonchi or rales.   Musculoskeletal:         General: Swelling (Fluctuant swelling of the left olecranon advancing distally about 4 inches along the ulna.), tenderness (Left upper back over the scapula and posterior ribs) and signs of " injury present.   Skin:     General: Skin is warm.      Findings: Lesion (Right forearm laceration well-approximated with 5 sutures.  Adjacent abrasions scabbed over.  Minimal surrounding erythema.) present. No bruising, erythema or rash.   Neurological:      General: No focal deficit present.      Mental Status: He is alert and oriented to person, place, and time.   Psychiatric:         Mood and Affect: Mood normal.         Behavior: Behavior normal.         Thought Content: Thought content normal.         Judgment: Judgment normal.

## 2024-11-03 LAB
BACTERIA SPEC BFLD CULT: NO GROWTH
GRAM STN SPEC: NORMAL

## 2024-11-04 LAB
BACTERIA SPEC BFLD CULT: ABNORMAL
GRAM STN SPEC: ABNORMAL

## 2024-11-06 ENCOUNTER — OFFICE VISIT (OUTPATIENT)
Dept: URGENT CARE | Facility: CLINIC | Age: 74
End: 2024-11-06
Payer: MEDICARE

## 2024-11-06 VITALS
SYSTOLIC BLOOD PRESSURE: 138 MMHG | TEMPERATURE: 97.9 F | OXYGEN SATURATION: 96 % | BODY MASS INDEX: 24.64 KG/M2 | RESPIRATION RATE: 18 BRPM | DIASTOLIC BLOOD PRESSURE: 72 MMHG | WEIGHT: 157 LBS | HEART RATE: 60 BPM | HEIGHT: 67 IN

## 2024-11-06 DIAGNOSIS — Z48.89 ENCOUNTER FOR POST SURGICAL WOUND CHECK: Primary | ICD-10-CM

## 2024-11-06 DIAGNOSIS — M70.22 OLECRANON BURSITIS OF LEFT ELBOW: ICD-10-CM

## 2024-11-06 PROCEDURE — G2211 COMPLEX E/M VISIT ADD ON: HCPCS | Performed by: FAMILY MEDICINE

## 2024-11-06 PROCEDURE — 87205 SMEAR GRAM STAIN: CPT | Performed by: FAMILY MEDICINE

## 2024-11-06 PROCEDURE — 99214 OFFICE O/P EST MOD 30 MIN: CPT | Performed by: FAMILY MEDICINE

## 2024-11-06 PROCEDURE — 87070 CULTURE OTHR SPECIMN AEROBIC: CPT | Performed by: FAMILY MEDICINE

## 2024-11-06 NOTE — PROGRESS NOTES
"  [facility]        NAME: Petr Silva is a 74 y.o. male  : 1950    MRN: [id number]  DATE: 2024  TIME: 3:05 PM    Assessment and Plan   Encounter for post surgical wound check [Z48.89]  1. Encounter for post surgical wound check  Suture removal      2. Olecranon bursitis of left elbow  Wound culture and Gram stain    Medium joint arthrocentesis        Suture removal    Date/Time: 2024 2:00 PM    Performed by: Tommy Luna MD  Authorized by: Tommy Luna MD  Universal Protocol:  procedure performed by consultantConsent: Verbal consent obtained.  Risks and benefits: risks, benefits and alternatives were discussed  Consent given by: patient and spouse  Time out: Immediately prior to procedure a \"time out\" was called to verify the correct patient, procedure, equipment, support staff and site/side marked as required.  Patient understanding: patient states understanding of the procedure being performed  Required items: required blood products, implants, devices, and special equipment available  Patient identity confirmed: verbally with patient      Patient location:  Clinic  Location:     Laterality:  Right    Location:  Upper extremity    Upper extremity location:  Arm  Procedure details:     Tools used:  Suture removal kit    Wound appearance:  No sign(s) of infection and good wound healing    Number of sutures removed:  5  Post-procedure details:     Post-removal:  Antibiotic ointment applied    Patient tolerance of procedure:  Tolerated well, no immediate complications      Medium joint arthrocentesis    Verbal consent obtained for drainage of an olecranon bursitis recurrence.  Betadine prep.  Freeze spray for topical anesthesia.    With an 18-gauge needle, 15 cc of serosanguineous fluid was evacuated from the left olecranon bursa.  Band-Aid applied.  Left elbow Ace wrapped with an ice pack.    Fluid analysis sent.  Recent culture result likely a contaminant; will " hold off on antibiotics for now.  If new culture has the same result, will treat with antibiotics.     If left elbow fluid collection recurs, will consider surgical management.    Patient Instructions     Follow up with PCP in 3-5 days.  Proceed to  ER if symptoms worsen.    If tests have been performed at [facility], our office will contact you with results if changes need to be made to the care plan discussed with you at the visit.  [facility]    Chief Complaint     Chief Complaint   Patient presents with    Suture / Staple Removal     Remove stitches       History of Present Illness       74-year-old male presents today for suture removal of a right forearm laceration.  No complications during the healing process.  Also reports recurrence of swelling in the left elbow after draining the fluid in his bursitis last week.  Ace wrap was applied and reports that there was no swelling for about 2 days and then gradually recurred to its current state.    Suture / Staple Removal      Review of Systems   Review of Systems   Constitutional:  Negative for chills and fever.   Respiratory:  Negative for cough and shortness of breath.    Cardiovascular:  Negative for chest pain.   Gastrointestinal:  Negative for abdominal pain.   Musculoskeletal:  Positive for joint swelling.   Skin:  Positive for color change and wound.   Neurological:  Negative for headaches.     Current Medications       Current Outpatient Medications:     acetaminophen (TYLENOL) 500 mg tablet, Take 500 mg by mouth, Disp: , Rfl:     amiodarone 200 mg tablet, Take 100 mg by mouth daily, Disp: , Rfl:     aspirin (ECOTRIN LOW STRENGTH) 81 mg EC tablet, Take 81 mg by mouth daily, Disp: , Rfl:     atorvastatin (LIPITOR) 10 mg tablet, Take 10 mg by mouth, Disp: , Rfl:     Cholecalciferol 2000 units CAPS, Take by mouth, Disp: , Rfl:     cilostazol (PLETAL) 50 mg tablet, Take 50 mg by mouth, Disp: , Rfl:     Entresto 49-51  MG TABS, Take 1 tablet by mouth 2 (two) times a day, Disp: , Rfl:     furosemide (LASIX) 20 mg tablet, Take 20 mg by mouth, Disp: , Rfl:     HYDROcodone-acetaminophen (NORCO) 5-325 mg per tablet, , Disp: , Rfl: 0    ipratropium-albuterol, FOR EMS ONLY, (DUO-NEB) 0.5-2.5 mg/3 mL nebulizer solution, 3 mL every 6 (six) hours as needed, Disp: , Rfl:     Jardiance 10 MG TABS tablet, Take 10 mg by mouth daily, Disp: , Rfl:     levothyroxine 50 mcg tablet, Take 1 tablet (50 mcg total) by mouth daily for 10 days, Disp: 10 tablet, Rfl: 0    metoprolol succinate (TOPROL-XL) 100 mg 24 hr tablet, Take 100 mg by mouth, Disp: , Rfl:     omeprazole (PriLOSEC) 40 MG capsule, , Disp: , Rfl: 3    Symbicort 160-4.5 MCG/ACT inhaler, Inhale 2 puffs 2 (two) times a day, Disp: , Rfl:     tiotropium (SPIRIVA) 18 mcg inhalation capsule, Place 18 mcg into inhaler and inhale daily, Disp: , Rfl:     VENTOLIN  (90 Base) MCG/ACT inhaler, , Disp: , Rfl: 6    ALPRAZolam (XANAX) 0.25 mg tablet, Take 0.25 mg by mouth (Patient not taking: Reported on 11/6/2024), Disp: , Rfl:     Current Allergies     Allergies as of 11/06/2024 - Reviewed 11/06/2024   Allergen Reaction Noted    Bactrim [sulfamethoxazole-trimethoprim] GI Intolerance 11/01/2024            The following portions of the patient's history were reviewed and updated as appropriate: allergies, current medications, past family history, past medical history, past social history, past surgical history and problem list.     Past Medical History:   Diagnosis Date    Arthritis     CHF (congestive heart failure) (HCC)     COPD (chronic obstructive pulmonary disease) (HCC)     Coronary artery disease     Heart failure (HCC)     Hepatitis C     Hx of CABG     Hypertension     Irregular heart beat     Paroxysmal A-fib (HCC)        Past Surgical History:   Procedure Laterality Date    ABDOMINAL SURGERY      HOLE IN STOMACH    CARDIAC PACEMAKER PLACEMENT  01/2017    icd/pacer    CARDIAC SURGERY   "2016    CORONARY ARTERY BYPASS GRAFT      MD COLONOSCOPY FLX DX W/COLLJ SPEC WHEN PFRMD N/A 7/24/2018    Procedure: EGD AND COLONOSCOPY;  Surgeon: Rosalino Branch MD;  Location: [facility];  Service: Gastroenterology       Family History   Problem Relation Age of Onset    Emphysema Mother     Heart disease Father          Medications have been verified.        Objective   /72   Pulse 60   Temp 97.9 °F (36.6 °C)   Resp 18   Ht 5' 7\" (1.702 m)   Wt 71.2 kg (157 lb)   SpO2 96%   BMI 24.59 kg/m²   No LMP for male patient.       Physical Exam     Physical Exam  Vitals and nursing note reviewed.   Constitutional:       General: He is not in acute distress.     Appearance: Normal appearance. He is normal weight. He is not ill-appearing, toxic-appearing or diaphoretic.   HENT:      Head: Normocephalic and atraumatic.   Eyes:      General:         Right eye: No discharge.         Left eye: No discharge.      Conjunctiva/sclera: Conjunctivae normal.   Pulmonary:      Effort: Pulmonary effort is normal.   Musculoskeletal:         General: Swelling and deformity (2 solid masses adjacent to the left olecranon likely indicate a chronically enlarged bursa broken in 2.) present. No tenderness or signs of injury.   Skin:     General: Skin is warm.      Findings: No erythema.   Neurological:      General: No focal deficit present.      Mental Status: He is alert and oriented to person, place, and time.      Motor: No weakness.      Gait: Gait normal.   Psychiatric:         Mood and Affect: Mood normal.         Behavior: Behavior normal.         Thought Content: Thought content normal.         Judgment: Judgment normal.                   "

## 2024-11-09 LAB
BACTERIA WND AEROBE CULT: NO GROWTH
GRAM STN SPEC: NORMAL
GRAM STN SPEC: NORMAL

## 2025-01-27 NOTE — PROGRESS NOTES
[facility]        NAME: Petr Silva is a 74 y.o. male  : 1950    MRN: [id number]  DATE: 2024  TIME: 1:15 PM    Assessment and Plan   Forearm laceration, right, subsequent encounter [S51.097H]  1. Forearm laceration, right, subsequent encounter        2. Visit for wound check            Patient Instructions   Wound check right forearm  No complications  Return 5 to 7 days for suture removal  Cleanse daily with warm soapy water  Keep covered if going out open to air if staying home    Follow up with PCP in 3-5 days.  Proceed to  ER if symptoms worsen.    If tests have been performed at [facility], our office will contact you with results if changes need to be made to the care plan discussed with you at the visit.  [facility]    Chief Complaint     Chief Complaint   Patient presents with    Wound Check     Patient had 5 sutures placed on 10/35/24 need them checked         History of Present Illness       Petr is a 74-year-old male who presents to clinic for wound check of right arm laceration.  He had 5 sutures placed on 10/25/2024.  He has not changed the dressing since here.  No pain bleeding or discharge from the wound.  No fever or chills.        Review of Systems   Review of Systems   Constitutional:  Negative for chills and fever.   Skin:  Positive for wound. Negative for color change.         Current Medications       Current Outpatient Medications:     acetaminophen (TYLENOL) 500 mg tablet, Take 500 mg by mouth, Disp: , Rfl:     ALPRAZolam (XANAX) 0.25 mg tablet, Take 0.25 mg by mouth (Patient not taking: Reported on 2024), Disp: , Rfl:     amiodarone 200 mg tablet, Take 100 mg by mouth daily, Disp: , Rfl:     aspirin (ECOTRIN LOW STRENGTH) 81 mg EC tablet, Take 81 mg by mouth daily, Disp: , Rfl:     atorvastatin (LIPITOR) 10 mg tablet, Take 10 mg by mouth, Disp: , Rfl:     Cholecalciferol 2000 units CAPS, Take by mouth,  Disp: , Rfl:     cilostazol (PLETAL) 50 mg tablet, Take 50 mg by mouth, Disp: , Rfl:     Entresto 49-51 MG TABS, Take 1 tablet by mouth 2 (two) times a day, Disp: , Rfl:     furosemide (LASIX) 20 mg tablet, Take 20 mg by mouth, Disp: , Rfl:     HYDROcodone-acetaminophen (NORCO) 5-325 mg per tablet, , Disp: , Rfl: 0    ipratropium-albuterol, FOR EMS ONLY, (DUO-NEB) 0.5-2.5 mg/3 mL nebulizer solution, 3 mL every 6 (six) hours as needed, Disp: , Rfl:     Jardiance 10 MG TABS tablet, Take 10 mg by mouth daily, Disp: , Rfl:     metoprolol succinate (TOPROL-XL) 100 mg 24 hr tablet, Take 100 mg by mouth, Disp: , Rfl:     omeprazole (PriLOSEC) 40 MG capsule, , Disp: , Rfl: 3    Symbicort 160-4.5 MCG/ACT inhaler, Inhale 2 puffs 2 (two) times a day, Disp: , Rfl:     tiotropium (SPIRIVA) 18 mcg inhalation capsule, Place 18 mcg into inhaler and inhale daily, Disp: , Rfl:     VENTOLIN  (90 Base) MCG/ACT inhaler, , Disp: , Rfl: 6    levothyroxine 50 mcg tablet, Take 1 tablet (50 mcg total) by mouth daily for 10 days, Disp: 10 tablet, Rfl: 0    Current Allergies     Allergies as of 10/28/2024    (No Known Allergies)            The following portions of the patient's history were reviewed and updated as appropriate: allergies, current medications, past family history, past medical history, past social history, past surgical history and problem list.     Past Medical History:   Diagnosis Date    Arthritis     CHF (congestive heart failure) (HCC)     COPD (chronic obstructive pulmonary disease) (HCC)     Coronary artery disease     Heart failure (HCC)     Hepatitis C     Hx of CABG     Hypertension     Irregular heart beat     Paroxysmal A-fib (HCC)        Past Surgical History:   Procedure Laterality Date    ABDOMINAL SURGERY      HOLE IN STOMACH    CARDIAC PACEMAKER PLACEMENT  01/2017    icd/pacer    CARDIAC SURGERY  2016    CORONARY ARTERY BYPASS GRAFT      DC COLONOSCOPY FLX DX W/COLLJ SPEC WHEN PFRMD N/A 7/24/2018     "Procedure: EGD AND COLONOSCOPY;  Surgeon: Rosalino Branch MD;  Location: [facility];  Service: Gastroenterology       Family History   Problem Relation Age of Onset    Emphysema Mother     Heart disease Father          Medications have been verified.        Objective   /80 (BP Location: Left arm, Patient Position: Sitting, Cuff Size: Standard)   Pulse 73   Temp 98 °F (36.7 °C) (Temporal)   Resp 19   Ht 5' 7\" (1.702 m)   Wt 71.7 kg (158 lb 2 oz)   SpO2 95%   BMI 24.77 kg/m²   No LMP for male patient.       Physical Exam     Physical Exam  Vitals and nursing note reviewed.   Constitutional:       General: He is not in acute distress.     Appearance: Normal appearance. He is not ill-appearing.   Pulmonary:      Effort: Pulmonary effort is normal.   Skin:     Findings: Laceration (Forearm laceration right arm clean dry and intact wound cleansed and redressed) present.   Neurological:      Mental Status: He is alert and oriented to person, place, and time.   Psychiatric:         Mood and Affect: Mood normal.         Behavior: Behavior normal.                   "